# Patient Record
Sex: FEMALE | Race: BLACK OR AFRICAN AMERICAN | NOT HISPANIC OR LATINO | Employment: PART TIME | URBAN - METROPOLITAN AREA
[De-identification: names, ages, dates, MRNs, and addresses within clinical notes are randomized per-mention and may not be internally consistent; named-entity substitution may affect disease eponyms.]

---

## 2023-10-10 ENCOUNTER — OFFICE VISIT (OUTPATIENT)
Dept: FAMILY MEDICINE | Facility: CLINIC | Age: 28
End: 2023-10-10
Payer: COMMERCIAL

## 2023-10-10 ENCOUNTER — HOSPITAL ENCOUNTER (OUTPATIENT)
Dept: GENERAL RADIOLOGY | Facility: HOSPITAL | Age: 28
Discharge: HOME | End: 2023-10-10
Payer: COMMERCIAL

## 2023-10-10 VITALS
OXYGEN SATURATION: 100 % | DIASTOLIC BLOOD PRESSURE: 75 MMHG | TEMPERATURE: 98 F | WEIGHT: 123 LBS | RESPIRATION RATE: 14 BRPM | HEART RATE: 85 BPM | SYSTOLIC BLOOD PRESSURE: 131 MMHG

## 2023-10-10 DIAGNOSIS — R07.81 RIB PAIN ON RIGHT SIDE: Primary | ICD-10-CM

## 2023-10-10 DIAGNOSIS — R07.81 RIB PAIN ON RIGHT SIDE: ICD-10-CM

## 2023-10-10 PROCEDURE — 99204 OFFICE O/P NEW MOD 45 MIN: CPT

## 2023-10-10 PROCEDURE — 71101 X-RAY EXAM UNILAT RIBS/CHEST: CPT | Mod: RT

## 2023-10-10 RX ORDER — CYCLOBENZAPRINE HCL 10 MG
10 TABLET ORAL 3 TIMES DAILY PRN
Qty: 30 TABLET | Refills: 0 | Status: SHIPPED | OUTPATIENT
Start: 2023-10-10

## 2023-10-10 NOTE — PATIENT INSTRUCTIONS
Diagnosis: Rib  bruising / contusion}  You  bruised}  one or more ribs.  Rib contusions} can be very painful because your ribs move when you breathe, cough, and move your upper body.     Today we did:  Xray: no signs of anything broken/ fractured or out of place      Plan:   Rib  contusions} don't need a cast like other bones.   On average they take 6 weeks to heal, sometimes 8 weeks. The first 3 to 4 weeks will be the most painful.   Ibuprofen or tylenol   No narcotics have a tendency to decrease the drive to breath, specifically bad with rib fractures.   Important to: do deep breathing, don't avoid b/c of pain   If coughing, or changing position from sitting to lying down, may cause the broken ends to move slightly, hug a pillow and brace/splint   No Rib belts or bandages around chest  have a tendency to decrease the drive to breath and cause pneumonia in rib fractures   These were used in the past but impair your ability to breathe and cough and increase the risk of developing pneumonia.   It is important to stay active. DO NOT rest in bed all day. However, you should avoid any heavy lifting or strenuous exertion until your pain improves.  Use ice and heat     It hurts to breathe when you have a broken rib. This puts you at risk of getting pneumonia from poor airflow through your lungs. To prevent this:   Take your incentive spirometer from the hospital home with you and use it at least 4 times a day, To use properly, inspire slowly over 5 seconds and try to get the ball as high as you can. Hold your breath afterwards and exhale slowly. This exercise builds up pressure inside the lung and prevents collapse of the small air sacs of the lung. This exercise may cause some pain at the site of injury. This is normal.   Other options are to take several very deep breaths once an hour while you're awake. Breathe out through pursed lips as if you are blowing up a balloon. If possible, actually blow up a balloon or a  rubber glove.   Monitor for:   Increasing chest pain with breathing   Shortness of breath   Fever or chills   Congested cough, nausea, or vomiting   Coughing up blood   Dizziness, weakness or fainting   Or other concerning symptoms

## 2023-10-10 NOTE — PROGRESS NOTES
URGENT CARE  Assessment & Plan   Assessment:   Rachel Hudson is a 28 year old female who's clinical presentation today is consistent with:   1. Rib pain on right side  - XR Ribs & Chest Right G/E 3 Views; Future  - cyclobenzaprine (FLEXERIL) 10 MG tablet;   Plan:  Radiologic films were negative} for fractures or dislocation today, along with lung pathology r/t rib injury, will treat patient at this time symptomatically and supportively, this will include encouraging: using NSAIDs/Tylenol to help decrease pain and inflammation, resting, applying ice and or heat as needed   Discussed importance of deep breathing and staying active to prevent pneumonia during acute rib pain, but that rest is important as well, discussed that ribs take 4-6 weeks to heal and no belts or immobilization/constriction is recommended, and the importance of monitoring for improvement.   Additionally we discussed if symptoms do not improve after starting today's treatment (or if symptoms worsen) to follow up in 2-3 weeks.    No alarm signs or symptoms present   Differential Diagnoses for this patient's chief complaint that I considered include:  fracture, dislocation, Pneumothorax, pulmonary embolism, pleural effusion, Ligamentous vs tendon pathology, musculoskeletal injury, soft tissue injury     Patient is} agreeable to treatment plan and state they will follow-up if symptoms do not improve and/or if symptoms worsen   see patient's AVS 'monitor for' section for specific patient instructions given and discussed regarding what to watch for and when to follow up    YOGESH [facility]      ______________________________________________________________________      Subjective     HPI: Rachel Hudson is a 28 year old  female who presents today for evaluation the following concerns:   Patient presents today endorsing they sustained a fall while play wresting with a friend , approximately a week ago   Patient  states they landed on their back  and felt pain.  She states the pain is in the middle right  side} of her back and also on the lower right front side of her ribs .  Patient denies any rib injuries in her past   Patient endorses pain that is not  radiating to her RUE or down her  legs to the buttock, calf, foot, and upper leg}  Patient endorses it hurts to take a big deep breath, cough or sneeze, but denies   associated symptoms including difficulty breathing or shortness of breath., weakness , numbness or tingling.  Patient denies any changes to range of motion in the RU extremity  Patient denies any changes of bowel or bladder.   Patient denies any redness, swelling, bruising at the site.    Review of Systems:  Pertinent review of systems as reflected in HPI, otherwise negative.     Objective    Physical Exam:  Vitals:    10/10/23 1658   BP: 131/75   BP Location: Right arm   Patient Position: Sitting   Cuff Size: Adult Regular   Pulse: 85   Resp: 14   Temp: 98  F (36.7  C)   TempSrc: Oral   SpO2: 100%   Weight: 55.8 kg (123 lb)      General:   alert and oriented, no acute distress, non-ill-appearing   Vital signs reviewed: afebrile and normotensive     SKIN: intact   NECK: supple, full ROM              No lymphadenopathy present   LUNG: normal work of breathing, good respiratory effort without retractions, good air  movement, non labored, inspection reveals normal chest expansion w/ inspiration            Lung sounds are clear to auscultation bilaterally  CV: normal S1 and S2.  ABDOMEN:  soft, non-tender, non-distended   MSK:   Ribs -right  lateral aspect} : no erythremia, ecchymosis, bruising, or inflammation present   Moderate to severe  tenderness/pain elicited with light palpation   No decreased range of motion    Upper extremity strength +5 bilaterally.   Temperature equal to body temperature.    No crepitus, no gross deformity, skin intact, and no laceration(s) present.   No numbness or tingling  present    Imaging:   All images were personally read by this provider (myself).   Per my independent interpretation the xray shows no fracture or dislocation seen of the ribs      ______________________________________________________________________    I explained my diagnostic considerations and recommendations to the patient, who voiced understanding and agreement with the treatment plan.   All questions were answered.   We discussed potential side effects, risks and benefits of any prescribed or recommended therapies, as well as expectations for response to treatments.  Please see AVS for any patient instructions & handouts given.   Patient was advised to contact the Nurse Care Line, their Primary Care provider, Urgent Care, or the Emergency Department if there are new or worsening symptoms, or call 911 for emergencies.

## 2024-05-14 ENCOUNTER — APPOINTMENT (OUTPATIENT)
Dept: RADIOLOGY | Facility: HOSPITAL | Age: 29
End: 2024-05-14
Attending: EMERGENCY MEDICINE

## 2024-05-14 ENCOUNTER — HOSPITAL ENCOUNTER (EMERGENCY)
Facility: HOSPITAL | Age: 29
Discharge: HOME | End: 2024-05-14
Admitting: EMERGENCY MEDICINE

## 2024-05-14 ENCOUNTER — APPOINTMENT (OUTPATIENT)
Dept: CT IMAGING | Facility: HOSPITAL | Age: 29
End: 2024-05-14
Attending: EMERGENCY MEDICINE

## 2024-05-14 VITALS
SYSTOLIC BLOOD PRESSURE: 116 MMHG | OXYGEN SATURATION: 98 % | DIASTOLIC BLOOD PRESSURE: 61 MMHG | HEART RATE: 71 BPM | HEIGHT: 66 IN | BODY MASS INDEX: 20.39 KG/M2 | RESPIRATION RATE: 20 BRPM | WEIGHT: 126.9 LBS | TEMPERATURE: 98 F
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
## 2024-05-14 LAB — HCG UR QL: NEGATIVE

## 2024-05-14 PROCEDURE — 81025 URINE PREGNANCY TEST: CPT | Performed by: EMERGENCY MEDICINE

## 2024-05-14 PROCEDURE — 70450 CT HEAD/BRAIN W/O DYE: CPT

## 2024-05-14 PROCEDURE — 99284 EMERGENCY DEPT VISIT MOD MDM: CPT | Mod: 25

## 2024-05-14 PROCEDURE — 250N000013 HC RX MED GY IP 250 OP 250 PS 637: Performed by: EMERGENCY MEDICINE

## 2024-05-14 PROCEDURE — 71101 X-RAY EXAM UNILAT RIBS/CHEST: CPT | Mod: RT

## 2024-05-14 RX ORDER — CYCLOBENZAPRINE HCL 10 MG
10 TABLET ORAL ONCE
Status: COMPLETED | OUTPATIENT
Start: 2024-05-14 | End: 2024-05-14

## 2024-05-14 RX ORDER — IBUPROFEN 600 MG/1
600 TABLET, FILM COATED ORAL ONCE
Status: COMPLETED | OUTPATIENT
Start: 2024-05-14 | End: 2024-05-14

## 2024-05-14 RX ORDER — CYCLOBENZAPRINE HCL 10 MG
10 TABLET ORAL 3 TIMES DAILY PRN
Qty: 15 TABLET | Refills: 0 | Status: SHIPPED | OUTPATIENT
Start: 2024-05-14 | End: 2024-05-20

## 2024-05-14 RX ORDER — ACETAMINOPHEN 325 MG/1
975 TABLET ORAL ONCE
Status: COMPLETED | OUTPATIENT
Start: 2024-05-14 | End: 2024-05-14

## 2024-05-14 RX ADMIN — ACETAMINOPHEN 975 MG: 325 TABLET ORAL at 12:15

## 2024-05-14 RX ADMIN — CYCLOBENZAPRINE 10 MG: 10 TABLET, FILM COATED ORAL at 12:16

## 2024-05-14 RX ADMIN — IBUPROFEN 600 MG: 600 TABLET, FILM COATED ORAL at 12:16

## 2024-05-14 ASSESSMENT — ENCOUNTER SYMPTOMS
CHILLS: 0
NUMBNESS: 0
VOMITING: 0
SHORTNESS OF BREATH: 0
ABDOMINAL PAIN: 0
BACK PAIN: 1
HEADACHES: 1
FEVER: 0
NECK PAIN: 0
NAUSEA: 0
WEAKNESS: 0
CONFUSION: 0

## 2024-05-14 ASSESSMENT — COLUMBIA-SUICIDE SEVERITY RATING SCALE - C-SSRS
2. HAVE YOU ACTUALLY HAD ANY THOUGHTS OF KILLING YOURSELF IN THE PAST MONTH?: NO
6. HAVE YOU EVER DONE ANYTHING, STARTED TO DO ANYTHING, OR PREPARED TO DO ANYTHING TO END YOUR LIFE?: NO
1. IN THE PAST MONTH, HAVE YOU WISHED YOU WERE DEAD OR WISHED YOU COULD GO TO SLEEP AND NOT WAKE UP?: NO
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
